# Patient Record
Sex: MALE | Race: WHITE | Employment: FULL TIME
[De-identification: names, ages, dates, MRNs, and addresses within clinical notes are randomized per-mention and may not be internally consistent; named-entity substitution may affect disease eponyms.]

---

## 2020-11-09 ENCOUNTER — NURSE TRIAGE (OUTPATIENT)
Dept: OTHER | Facility: CLINIC | Age: 30
End: 2020-11-09

## 2020-11-09 NOTE — TELEPHONE ENCOUNTER
Patient called [address] [facility] contact center [facility]) with red flag complaint; transferred for triage by Frank Tolentino. Pt calls to report symptoms of chills, joint pain, sore throat, cough, loss of smell and taste, fatigue, runny nose, diarrhea, headache, muscle pain, and fever. States symptoms started Friday. Rates cough as mild. Most recent temperature was 100.3 orally. Reports fevers are gone and then coming back. Denies breathing difficulty or chest pains. Informed of disposition. Care advice as documented. Instructed to call back with worsening symptoms. Soft transfer to [facility] ([facility]) Radha Parra) to schedule appointment as recommended. Attention Provider: Thank you for allowing me to participate in the care of your patient. The patient was connected to triage in response to information provided to the [facility]. Please do not respond through this encounter as the response is not directed to a shared pool. Reason for Disposition   [1] Fever returns after gone for over 24 hours AND [2] symptoms worse or not improved    Answer Assessment - Initial Assessment Questions  1. COVID-19 DIAGNOSIS: \"Who made your Coronavirus (COVID-19) diagnosis? \" \"Was it confirmed by a positive lab test?\" If not diagnosed by a HCP, ask \"Are there lots of cases (community spread) where you live? \" (See public health department website, if unsure)      n/a    2. ONSET: \"When did the COVID-19 symptoms start?\"       11/6/20    3. WORST SYMPTOM: \"What is your worst symptom? \" (e.g., cough, fever, shortness of breath, muscle aches)      Body aches    4. COUGH: \"Do you have a cough? \" If so, ask: \"How bad is the cough? \"        mild    5. FEVER: \"Do you have a fever? \" If so, ask: \"What is your temperature, how was it measured, and when did it start? \"      100.3 orally    6. RESPIRATORY STATUS: \"Describe your breathing? \" (e.g., shortness of breath, wheezing, unable to speak)       normal    7.  BETTER-SAME-WORSE: Daniel Castanedaes you getting better, staying the same or getting worse compared to yesterday? \"  If getting worse, ask, \"In what way? \"      Same    8. HIGH RISK DISEASE: \"Do you have any chronic medical problems? \" (e.g., asthma, heart or lung disease, weak immune system, etc.)      No    9. PREGNANCY: \"Is there any chance you are pregnant? \" \"When was your last menstrual period? \"      NA    10. OTHER SYMPTOMS: \"Do you have any other symptoms? \"  (e.g., chills, fatigue, headache, loss of smell or taste, muscle pain, sore throat)        Chills, joint pain, sore throat, cough, loss of smell and taste, fatigue, runny nose, diarrhea, headaches, muscle pain and fever.     Protocols used: CORONAVIRUS (COVID-19) DIAGNOSED OR SUSPECTED-ADULT-

## 2025-03-04 ENCOUNTER — APPOINTMENT (OUTPATIENT)
Dept: PRIMARY CARE | Facility: CLINIC | Age: 35
End: 2025-03-04
Payer: COMMERCIAL

## 2025-03-04 VITALS
HEART RATE: 84 BPM | DIASTOLIC BLOOD PRESSURE: 68 MMHG | BODY MASS INDEX: 43.86 KG/M2 | WEIGHT: 289.4 LBS | SYSTOLIC BLOOD PRESSURE: 110 MMHG | TEMPERATURE: 97.8 F | OXYGEN SATURATION: 98 % | HEIGHT: 68 IN

## 2025-03-04 DIAGNOSIS — Z76.89 ENCOUNTER TO ESTABLISH CARE: Primary | ICD-10-CM

## 2025-03-04 DIAGNOSIS — Z00.00 ENCOUNTER FOR HEALTH MAINTENANCE EXAMINATION: ICD-10-CM

## 2025-03-04 DIAGNOSIS — E66.9 OBESITY, UNSPECIFIED CLASS, UNSPECIFIED OBESITY TYPE, UNSPECIFIED WHETHER SERIOUS COMORBIDITY PRESENT: ICD-10-CM

## 2025-03-04 PROBLEM — F32.A MILD DEPRESSION: Status: ACTIVE | Noted: 2025-03-04

## 2025-03-04 PROBLEM — J45.909 ASTHMA: Status: RESOLVED | Noted: 2025-03-04 | Resolved: 2025-03-04

## 2025-03-04 PROBLEM — F41.9 ANXIETY: Status: ACTIVE | Noted: 2025-03-04

## 2025-03-04 PROBLEM — J45.909 ASTHMA: Status: ACTIVE | Noted: 2025-03-04

## 2025-03-04 PROCEDURE — 99385 PREV VISIT NEW AGE 18-39: CPT | Performed by: NURSE PRACTITIONER

## 2025-03-04 PROCEDURE — 3008F BODY MASS INDEX DOCD: CPT | Performed by: NURSE PRACTITIONER

## 2025-03-04 RX ORDER — TIRZEPATIDE 2.5 MG/.5ML
2.5 INJECTION, SOLUTION SUBCUTANEOUS
COMMUNITY
Start: 2025-02-10 | End: 2025-03-04 | Stop reason: SDUPTHER

## 2025-03-04 RX ORDER — TIRZEPATIDE 2.5 MG/.5ML
2.5 INJECTION, SOLUTION SUBCUTANEOUS
Qty: 4 EACH | Refills: 0 | Status: SHIPPED | OUTPATIENT
Start: 2025-03-04

## 2025-03-04 RX ORDER — ALBUTEROL SULFATE 90 UG/1
2 INHALANT RESPIRATORY (INHALATION) EVERY 6 HOURS PRN
COMMUNITY
Start: 2025-01-13 | End: 2025-03-04 | Stop reason: WASHOUT

## 2025-03-04 RX ORDER — BUPROPION HYDROCHLORIDE 150 MG/1
1 TABLET ORAL
COMMUNITY
Start: 2019-09-25 | End: 2025-03-04 | Stop reason: WASHOUT

## 2025-03-04 ASSESSMENT — ENCOUNTER SYMPTOMS
ARTHRALGIAS: 0
CONFUSION: 0
BACK PAIN: 0
FEVER: 0
APPETITE CHANGE: 0
CONSTIPATION: 0
SHORTNESS OF BREATH: 0
COUGH: 0
MYALGIAS: 0
WHEEZING: 0
PALPITATIONS: 0
FATIGUE: 0
DIZZINESS: 0
UNEXPECTED WEIGHT CHANGE: 0
DIAPHORESIS: 0
DIARRHEA: 0
FACIAL ASYMMETRY: 0
HEADACHES: 0
CHILLS: 0
ABDOMINAL PAIN: 0
WOUND: 0
NERVOUS/ANXIOUS: 0
CHEST TIGHTNESS: 0

## 2025-03-04 ASSESSMENT — PATIENT HEALTH QUESTIONNAIRE - PHQ9
1. LITTLE INTEREST OR PLEASURE IN DOING THINGS: NOT AT ALL
SUM OF ALL RESPONSES TO PHQ9 QUESTIONS 1 AND 2: 0
2. FEELING DOWN, DEPRESSED OR HOPELESS: NOT AT ALL

## 2025-03-04 NOTE — PROGRESS NOTES
"Subjective   Patient ID: Jack Martin is a 34 y.o. male who presents for Establish Care.    HPI entered by Medical Assistant and reviewed/updated by myself.    Patient presents in the office today to establish care. Patient was previously seen in this office by Dr. Medina. Patient last seen this physician about 5 years ago. Patient heard about us through [employer]. Patients last eye exam was 4 months ago. Patients last dental appointment was a few years.    Patient is doing weight loss via internet provider Ro and would like for a provider to monitor and regulate. Highest weight has been 307, Zepbound starting weight was 299. Has been on it for 3 weeks.  Some belching but getting better. Eating healthy, especially with dinner. Working out at the park and with home [employer] gym.    Hx childhood asthma. No issues as an adult. Anxiety and depression symptoms resolved at this time.    The patient's allergies, medications, and history were reviewed with them today and updated as indicated.    Review of Systems   Constitutional:  Negative for appetite change, chills, diaphoresis, fatigue, fever and unexpected weight change.   Respiratory:  Negative for cough, chest tightness, shortness of breath and wheezing.    Cardiovascular:  Negative for chest pain, palpitations and leg swelling.   Gastrointestinal:  Negative for abdominal pain, constipation and diarrhea.   Musculoskeletal:  Negative for arthralgias, back pain and myalgias.   Skin:  Negative for rash and wound.   Neurological:  Negative for dizziness, syncope, facial asymmetry and headaches.   Psychiatric/Behavioral:  Negative for confusion. The patient is not nervous/anxious.       Objective   Vital Signs: /68 (BP Location: Left arm, Patient Position: Sitting)   Pulse 84   Temp 36.6 °C (97.8 °F) (Temporal)   Ht 1.727 m (5' 8\")   Wt 131 kg (289 lb 6.4 oz)   SpO2 98%   BMI 44.00 kg/m²     Physical Exam  Vitals and nursing note reviewed.   Constitutional:  "      General: He is not in acute distress.     Appearance: Normal appearance. He is not ill-appearing.   HENT:      Head: Normocephalic and atraumatic.      Right Ear: External ear normal.      Left Ear: External ear normal.      Nose: No congestion or rhinorrhea.      Mouth/Throat:      Mouth: Mucous membranes are moist.      Pharynx: No oropharyngeal exudate or posterior oropharyngeal erythema.   Eyes:      General:         Right eye: No discharge.         Left eye: No discharge.      Extraocular Movements: Extraocular movements intact.      Conjunctiva/sclera: Conjunctivae normal.      Pupils: Pupils are equal, round, and reactive to light.   Cardiovascular:      Rate and Rhythm: Normal rate and regular rhythm.      Heart sounds: No murmur heard.  Pulmonary:      Effort: Pulmonary effort is normal. No respiratory distress.      Breath sounds: Normal breath sounds and air entry.   Abdominal:      General: Bowel sounds are normal.      Palpations: Abdomen is soft.   Musculoskeletal:      Right lower leg: No edema.      Left lower leg: No edema.   Skin:     General: Skin is warm and dry.      Coloration: Skin is not jaundiced.      Findings: No erythema or rash.   Neurological:      General: No focal deficit present.      Mental Status: He is alert. Mental status is at baseline.   Psychiatric:         Mood and Affect: Mood normal.         Behavior: Behavior normal.         Thought Content: Thought content normal.     POCT today: No results found for this visit on 03/04/25.     Assessment & Plan  1. Encounter to establish care  First visit to [facility]. Reviewed usual process for reviewing results, follow up care, scheduling sick visits as needed. I am in the office Tuesday-Friday most weeks. MyChart or calling the office directly at [phone], option 2, are the best ways to reach me and/or schedule appointments.       2. Encounter for health maintenance examination  Health maintenance reviewed  and updated.  Patient states he recently had labs done at work.  He will send me a copy to review, then he will complete any additional lab work if/as needed.      3. BMI 40.0-44.9, adult (Multi)  Patient has been taking Zepbound from online provider Ro for the last 3 weeks.  He would like to transition to having me manage this for him instead.  He was having some minimal GI side effects with burping/belching, but these are slowing down.  No issues with constipation or abdominal pain.  He has lost 10 pounds over the last 3 weeks.  Will send new order to the local pharmacy.  He will continue with his healthy diet and his home exercise program.      4. Obesity, unspecified class, unspecified obesity type, unspecified whether serious comorbidity present  See above.      Reviewed/discussed treatment options and health maintenance. Take medications as prescribed. We will contact you with the results of any ordered testing; please send a [employer] message or call the office if you do not hear from us. Follow up in the office with me in 6-8 weeks and as needed. Return sooner if symptoms do not resolve as expected.     Elena Singh, APRN-CNP, DNP  Family Nurse Practitioner  [facility]

## 2025-03-05 ENCOUNTER — TELEPHONE (OUTPATIENT)
Dept: PRIMARY CARE | Facility: CLINIC | Age: 35
End: 2025-03-05
Payer: COMMERCIAL

## 2025-03-05 NOTE — TELEPHONE ENCOUNTER
PLEASE SEE LABS IN MEDIA TAB AND ADVISE    Jack HAYWOOD Do [id number] [facility] Clinical Support Staff (supporting Elena Singh, APRN-CNP, DNP)1 hour ago (5:44 AM)     RC  Good morning Elena, here is the blood work that I had done with work. Let me know if this is sufficient or not.  Thank you.

## 2025-03-06 PROBLEM — F32.A MILD DEPRESSION: Status: RESOLVED | Noted: 2025-03-04 | Resolved: 2025-03-06

## 2025-03-06 PROBLEM — F41.9 ANXIETY: Status: RESOLVED | Noted: 2025-03-04 | Resolved: 2025-03-06

## 2025-03-06 NOTE — TELEPHONE ENCOUNTER
SPOKE TO JESUS IN THE LAB. SHE STATES WE HAVE TO CANCEL IT ON OUR END. ORDER CANCELLED THROUGH THE ENCOUNTER ON 3/4/25

## 2025-03-06 NOTE — TELEPHONE ENCOUNTER
Please call [facility] and cancel the lipid panel I ordered on 3/4/2025.  -----  [employer] message sent to patient.

## 2025-03-12 ENCOUNTER — TELEPHONE (OUTPATIENT)
Dept: PRIMARY CARE | Facility: CLINIC | Age: 35
End: 2025-03-12
Payer: COMMERCIAL

## 2025-03-12 NOTE — TELEPHONE ENCOUNTER
Please advise     Jack [address] [address] Clinical Support Staff (supporting MG Vogel, SCOTT)48 minutes ago (1:34 PM)   RC  I can run through this month and see if it changes drastically. Theres more hunger than when I first started, but I would like to see if I lose anymore on a lower dose . I guess I'll reach back out by week 3 of this round if that's OK? Branden [address] [place] [address] Clinical Support Staff (supporting MG Vogel, SCOTT)1 hour ago (1:19 PM)   AWAIS  I am definitely feeling more hungry as of lately and haven't felt as full as when I started the 2.5 . This is my 5th week with it.   I am at 289 this morning still        You  Jack Randolph hour ago (1:14 PM)     After eating do you feel tolbert faster? How much weight have you lost? How has your appetite been?       Jack [address] [place] [address] Clinical Support Staff (supporting MG Vogel, SCOTT)2 hours ago (11:53 AM)   AWAIS  Good morning,  I was curious about going to the 5mg dosage. I was reading that the 2.5 was just a kind of introduction to get your body used to it after a month.

## 2025-03-13 ENCOUNTER — TELEPHONE (OUTPATIENT)
Dept: PRIMARY CARE | Facility: CLINIC | Age: 35
End: 2025-03-13
Payer: COMMERCIAL

## 2025-03-13 DIAGNOSIS — E66.9 OBESITY, UNSPECIFIED CLASS, UNSPECIFIED OBESITY TYPE, UNSPECIFIED WHETHER SERIOUS COMORBIDITY PRESENT: ICD-10-CM

## 2025-03-13 NOTE — TELEPHONE ENCOUNTER
MEDICATION PENDED FOR NEXT DOSE    Jack HAYWOOD Do [id number] [facility] Clinical Support Staff (supporting MG Vogel, SCOTT)15 hours ago (4:09 PM)     RC  Ok , we can go up then . Thank you        Elena L Pelfrey, APRN-CNP, SCOTT Martin15 hours ago (4:04 PM)     SP  Hmmm... It does sound like it's probably time to go up to the next dose soon since your weight has leveled off and you are getting more hungry.     It is always your decision, but we should consider going up to the 5 mg dose with your next refill at minimum.     Thanks for the update!  Elena

## 2025-03-17 ENCOUNTER — TELEPHONE (OUTPATIENT)
Dept: PRIMARY CARE | Facility: CLINIC | Age: 35
End: 2025-03-17
Payer: COMMERCIAL

## 2025-03-17 NOTE — TELEPHONE ENCOUNTER
Please advise     Jack Martin Do [id number] [place] [address] Clinical Support Staff  Phone Number: [phone]     I saw that the 5mg prescription came through, should I finish up the 2.5 or switch? Or would it be acceptable to do 2 doses of 2.5? Sorry for all of the questions

## 2025-03-18 ENCOUNTER — TELEPHONE (OUTPATIENT)
Dept: PRIMARY CARE | Facility: CLINIC | Age: 35
End: 2025-03-18
Payer: COMMERCIAL

## 2025-03-18 NOTE — TELEPHONE ENCOUNTER
[employer] message sent.  
Pleased advise     Jack Martina[address] [address] Clinical Support Staff  Phone Number: [phone]     I picked up the 2.5 prescription last Monday ( I think)  lol,  I have used one so far. There are 3 left  
n/a

## 2025-03-19 ENCOUNTER — APPOINTMENT (OUTPATIENT)
Dept: PRIMARY CARE | Facility: CLINIC | Age: 35
End: 2025-03-19
Payer: COMMERCIAL

## 2025-03-21 ENCOUNTER — TELEPHONE (OUTPATIENT)
Dept: PRIMARY CARE | Facility: CLINIC | Age: 35
End: 2025-03-21
Payer: COMMERCIAL

## 2025-03-21 NOTE — TELEPHONE ENCOUNTER
Please advise     Jack [address] [place] [address] Clinical Support Staff (supporting MG Vogel, SCOTT)1 hour ago (2:42 PM)     RC  I am at 284 currently, I guess the 2.5 is still working. Will I get sick if I take 5mg if the 2.5 is still letting me lose weight?        Jack [address] [address] Clinical Support Staff (supporting MG Vogel, SCOTT)2 days ago     RC  Awesome,  I was able to get the 5mg yesterday from the pharmacy so I will do that. Thank you

## 2025-04-02 ENCOUNTER — TELEPHONE (OUTPATIENT)
Dept: PRIMARY CARE | Facility: CLINIC | Age: 35
End: 2025-04-02
Payer: COMMERCIAL

## 2025-04-02 NOTE — TELEPHONE ENCOUNTER
Please advise, last seen in office on 3/4/25. Has an upcoming appointment on 4/22/25    Jack Martina[address] [address] Clinical Support Staff  Phone Number: [phone]     Good afternoon, would it be possible to get some antibiotics for a sinus infection?

## 2025-04-03 ENCOUNTER — APPOINTMENT (OUTPATIENT)
Dept: PRIMARY CARE | Facility: CLINIC | Age: 35
End: 2025-04-03
Payer: COMMERCIAL

## 2025-04-03 NOTE — PROGRESS NOTES
"Subjective   Patient ID: Jack Martin is a 34 y.o. male who presents for No chief complaint on file..    HPI entered by Medical Assistant and reviewed/updated by myself.    Patient presents in office for a sinus infection. Ongoing x ***. Symptoms include ***. Has tried *** OTC medication. Admits/denies relief.     Virtual or Telephone Consent    {Complete for a virtual or telephone visit:00738}  {Select who provided consent:63309}     The patient's allergies, medications, and history were reviewed with them today and updated as indicated.    Review of Systems   Objective   Vital Signs: There were no vitals taken for this visit.    Physical ExamPOCT today: No results found for this visit on 04/03/25.     Assessment & Plan  No diagnosis found.Reviewed/discussed treatment options and health maintenance. Take medications as prescribed. We will contact you with the results of any ordered testing; please send a [employer] message or call the office if you do not hear from us. Follow up in the office {Follow up:20616::\"and as needed.\"} Return sooner if symptoms do not resolve as expected.     Elena Singh, APRN-CNP, DNP  Family Nurse Practitioner  [facility]  "

## 2025-04-10 ENCOUNTER — TELEPHONE (OUTPATIENT)
Dept: PRIMARY CARE | Facility: CLINIC | Age: 35
End: 2025-04-10
Payer: COMMERCIAL

## 2025-04-10 DIAGNOSIS — K21.9 GASTROESOPHAGEAL REFLUX DISEASE WITHOUT ESOPHAGITIS: Primary | ICD-10-CM

## 2025-04-10 RX ORDER — VONOPRAZAN FUMARATE 13.36 MG/1
10 TABLET ORAL DAILY
Qty: 90 TABLET | Refills: 1 | Status: SHIPPED | OUTPATIENT
Start: 2025-04-10

## 2025-04-10 NOTE — TELEPHONE ENCOUNTER
PLEASE ADVISE    Jack HAYWOOD Do [id number] [facility] Clinical Support Staff (supporting Elena Singh, APRN-CNP, DNP)28 minutes ago (8:32 AM)     RC  I have generally had heartburn and stomach acid issues. It seems worse at times on zepbound,  is there anything I can get prescribed to help?

## 2025-04-19 LAB
ALBUMIN SERPL-MCNC: 4.7 G/DL (ref 3.6–5.1)
ALP SERPL-CCNC: 77 U/L (ref 36–130)
ALT SERPL-CCNC: 25 U/L (ref 9–46)
ANION GAP SERPL CALCULATED.4IONS-SCNC: 10 MMOL/L (CALC) (ref 7–17)
AST SERPL-CCNC: 15 U/L (ref 10–40)
BASOPHILS # BLD AUTO: 47 CELLS/UL (ref 0–200)
BASOPHILS NFR BLD AUTO: 0.4 %
BILIRUB SERPL-MCNC: 0.3 MG/DL (ref 0.2–1.2)
BUN SERPL-MCNC: 21 MG/DL (ref 7–25)
CALCIUM SERPL-MCNC: 9.7 MG/DL (ref 8.6–10.3)
CHLORIDE SERPL-SCNC: 104 MMOL/L (ref 98–110)
CO2 SERPL-SCNC: 25 MMOL/L (ref 20–32)
CREAT SERPL-MCNC: 1.18 MG/DL (ref 0.6–1.26)
EGFRCR SERPLBLD CKD-EPI 2021: 83 ML/MIN/1.73M2
EOSINOPHIL # BLD AUTO: 164 CELLS/UL (ref 15–500)
EOSINOPHIL NFR BLD AUTO: 1.4 %
ERYTHROCYTE [DISTWIDTH] IN BLOOD BY AUTOMATED COUNT: 13.2 % (ref 11–15)
GLUCOSE SERPL-MCNC: 98 MG/DL (ref 65–99)
HCT VFR BLD AUTO: 46 % (ref 38.5–50)
HGB BLD-MCNC: 14.9 G/DL (ref 13.2–17.1)
LYMPHOCYTES # BLD AUTO: 3674 CELLS/UL (ref 850–3900)
LYMPHOCYTES NFR BLD AUTO: 31.4 %
MCH RBC QN AUTO: 26.9 PG (ref 27–33)
MCHC RBC AUTO-ENTMCNC: 32.4 G/DL (ref 32–36)
MCV RBC AUTO: 83.2 FL (ref 80–100)
MONOCYTES # BLD AUTO: 971 CELLS/UL (ref 200–950)
MONOCYTES NFR BLD AUTO: 8.3 %
NEUTROPHILS # BLD AUTO: 6845 CELLS/UL (ref 1500–7800)
NEUTROPHILS NFR BLD AUTO: 58.5 %
PLATELET # BLD AUTO: 314 THOUSAND/UL (ref 140–400)
PMV BLD REES-ECKER: 11.9 FL (ref 7.5–12.5)
POTASSIUM SERPL-SCNC: 4.6 MMOL/L (ref 3.5–5.3)
PROT SERPL-MCNC: 7.7 G/DL (ref 6.1–8.1)
RBC # BLD AUTO: 5.53 MILLION/UL (ref 4.2–5.8)
SODIUM SERPL-SCNC: 139 MMOL/L (ref 135–146)
WBC # BLD AUTO: 11.7 THOUSAND/UL (ref 3.8–10.8)

## 2025-04-22 ENCOUNTER — APPOINTMENT (OUTPATIENT)
Dept: PRIMARY CARE | Facility: CLINIC | Age: 35
End: 2025-04-22
Payer: COMMERCIAL

## 2025-04-22 VITALS
HEIGHT: 68 IN | DIASTOLIC BLOOD PRESSURE: 70 MMHG | OXYGEN SATURATION: 96 % | BODY MASS INDEX: 42.59 KG/M2 | TEMPERATURE: 97.9 F | WEIGHT: 281 LBS | HEART RATE: 110 BPM | SYSTOLIC BLOOD PRESSURE: 140 MMHG

## 2025-04-22 DIAGNOSIS — Z76.89 ENCOUNTER FOR WEIGHT MANAGEMENT: Primary | ICD-10-CM

## 2025-04-22 DIAGNOSIS — E66.9 OBESITY, UNSPECIFIED CLASS, UNSPECIFIED OBESITY TYPE, UNSPECIFIED WHETHER SERIOUS COMORBIDITY PRESENT: ICD-10-CM

## 2025-04-22 DIAGNOSIS — R53.83 OTHER FATIGUE: ICD-10-CM

## 2025-04-22 DIAGNOSIS — F41.9 ANXIETY: ICD-10-CM

## 2025-04-22 PROCEDURE — 3008F BODY MASS INDEX DOCD: CPT | Performed by: NURSE PRACTITIONER

## 2025-04-22 PROCEDURE — 99214 OFFICE O/P EST MOD 30 MIN: CPT | Performed by: NURSE PRACTITIONER

## 2025-04-22 RX ORDER — BUPROPION HYDROCHLORIDE 150 MG/1
150 TABLET ORAL DAILY
Qty: 30 TABLET | Refills: 11 | Status: SHIPPED | OUTPATIENT
Start: 2025-04-22 | End: 2026-04-22

## 2025-04-22 ASSESSMENT — PATIENT HEALTH QUESTIONNAIRE - PHQ9
SUM OF ALL RESPONSES TO PHQ9 QUESTIONS 1 AND 2: 0
2. FEELING DOWN, DEPRESSED OR HOPELESS: NOT AT ALL
1. LITTLE INTEREST OR PLEASURE IN DOING THINGS: NOT AT ALL

## 2025-04-22 NOTE — PROGRESS NOTES
"Subjective   Patient ID: Jack Martin is a 35 y.o. male who presents for Weight Loss and Anxiety.    HPI entered by Medical Assistant and reviewed/updated by myself.    Weight loss management Follow up   Taking ZEPBOUND  5 mg   Following up for month # 3  Has been eating a generally healthy diet  Exercises 4 x per week  What type of exercise lifting,walking,hiking, yard work  Patient last in office weight 289.6 lbs  At home weight 278.6 lbs  Today's in office weight 281 lbs   Patient is - 8.6 lbs   Patient's total weight loss with at home janet is -11 lbs   Has previously taken nothing   Any side effects noted? Sometimes heartburn and constipation, much improved since starting Voquezna.    Patient would like something for Anxiety and for provider to go over recent lab work. Previously on Cymbalta, caused insomnia, erectile dysfunction.    The patient's allergies, medications, and history were reviewed with them today and updated as indicated.    Review of Systems   Constitutional:  Negative for appetite change, chills, diaphoresis, fatigue, fever and unexpected weight change.   Respiratory:  Negative for cough, chest tightness, shortness of breath and wheezing.    Cardiovascular:  Negative for chest pain, palpitations and leg swelling.   Gastrointestinal:  Negative for abdominal pain, constipation and diarrhea.   Musculoskeletal:  Negative for arthralgias, back pain and myalgias.   Skin:  Negative for rash and wound.   Neurological:  Negative for dizziness, syncope, facial asymmetry and headaches.   Psychiatric/Behavioral:  Negative for confusion. The patient is nervous/anxious.       Objective   Vital Signs: /70 (BP Location: Left arm, Patient Position: Sitting)   Pulse 110   Temp 36.6 °C (97.9 °F) (Temporal)   Ht 1.727 m (5' 8\")   Wt 127 kg (281 lb)   SpO2 96%   BMI 42.73 kg/m²     Physical Exam  Vitals and nursing note reviewed.   Constitutional:       General: He is not in acute distress.     " Appearance: Normal appearance. He is not ill-appearing.   HENT:      Head: Normocephalic and atraumatic.      Right Ear: External ear normal.      Left Ear: External ear normal.      Nose: No congestion or rhinorrhea.      Mouth/Throat:      Mouth: Mucous membranes are moist.      Pharynx: No oropharyngeal exudate or posterior oropharyngeal erythema.   Eyes:      General:         Right eye: No discharge.         Left eye: No discharge.      Extraocular Movements: Extraocular movements intact.      Conjunctiva/sclera: Conjunctivae normal.      Pupils: Pupils are equal, round, and reactive to light.   Cardiovascular:      Rate and Rhythm: Normal rate and regular rhythm.      Heart sounds: No murmur heard.  Pulmonary:      Effort: Pulmonary effort is normal. No respiratory distress.      Breath sounds: Normal breath sounds and air entry.   Abdominal:      General: Bowel sounds are normal. There is no distension.      Palpations: Abdomen is soft.      Tenderness: There is no abdominal tenderness.   Musculoskeletal:      Right lower leg: No edema.      Left lower leg: No edema.   Skin:     General: Skin is warm and dry.      Coloration: Skin is not jaundiced.      Findings: No erythema or rash.   Neurological:      General: No focal deficit present.      Mental Status: He is alert. Mental status is at baseline.   Psychiatric:         Mood and Affect: Mood normal.         Behavior: Behavior normal.         Thought Content: Thought content normal.      POCT today: No results found for this visit on 04/22/25.     Assessment & Plan  1. Encounter for weight management  Doing well with Zepbound.  Has lost 11 pounds over the last 2 months.  GI side effects have improved with addition of PPI.  Will continue at same dose for now.  Continue healthy diet and exercise at least 150 minutes/week.  If weight loss stalls or appetite increases, let me know and we will increase the dose for future refills.  Verbalized understanding.       2. BMI 40.0-44.9, adult (Multi)  See above.  -tirzepatide, weight loss, (Zepbound) 5 mg/0.5 mL injection      3. Obesity, unspecified class, unspecified obesity type, unspecified whether serious comorbidity present  See above.  -tirzepatide, weight loss, (Zepbound) 5 mg/0.5 mL injection      4. 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  He was previously on Cymbalta but had side effects from it including erectile dysfunction.  Will trial Wellbutrin instead.    buPROPion XL (Wellbutrin XL) 150 mg 24 hr tablet      5. Other fatigue  See above.  -Testosterone, total and free    -Tsh With Reflex To Free T4 If Abnormal      Reviewed/discussed treatment options and health maintenance. Take medications as prescribed. We will contact you with the results of any ordered testing; please send a OneTouch message or call the office if you do not hear from us. Follow up in the office with me in 3 months and as needed. Return sooner if symptoms do not resolve as expected.     Elena Singh, APRN-CNP, DNP  Family Nurse Practitioner  [facility]

## 2025-05-01 PROBLEM — E66.9 OBESITY: Status: ACTIVE | Noted: 2025-05-01

## 2025-05-01 ASSESSMENT — ENCOUNTER SYMPTOMS
CONSTIPATION: 0
NERVOUS/ANXIOUS: 1
CHEST TIGHTNESS: 0
PALPITATIONS: 0
ARTHRALGIAS: 0
ABDOMINAL PAIN: 0
FACIAL ASYMMETRY: 0
COUGH: 0
FATIGUE: 0
DIZZINESS: 0
UNEXPECTED WEIGHT CHANGE: 0
CONFUSION: 0
HEADACHES: 0
CHILLS: 0
APPETITE CHANGE: 0
WOUND: 0
WHEEZING: 0
DIAPHORESIS: 0
BACK PAIN: 0
FEVER: 0
SHORTNESS OF BREATH: 0
DIARRHEA: 0
MYALGIAS: 0

## 2025-05-05 ENCOUNTER — PATIENT MESSAGE (OUTPATIENT)
Dept: PRIMARY CARE | Facility: CLINIC | Age: 35
End: 2025-05-05
Payer: COMMERCIAL

## 2025-05-05 ENCOUNTER — TELEPHONE (OUTPATIENT)
Dept: PRIMARY CARE | Facility: CLINIC | Age: 35
End: 2025-05-05

## 2025-05-05 NOTE — TELEPHONE ENCOUNTER
Jack Am[address] [facility] Clinical Support Staff (supporting Elena Singh, APRN-CNP, DNP) (Selected Message)        5/5/25  6:37 AM  At the end of this month would it be OK to up the dosage from 150 to what's next? Also is there anything that I could possibly take for like situational on occasion on top of the wellbutrin?

## 2025-05-06 ENCOUNTER — TELEPHONE (OUTPATIENT)
Dept: PRIMARY CARE | Facility: CLINIC | Age: 35
End: 2025-05-06
Payer: COMMERCIAL

## 2025-05-06 DIAGNOSIS — F41.9 ANXIETY: Primary | ICD-10-CM

## 2025-05-06 DIAGNOSIS — E66.9 OBESITY, UNSPECIFIED CLASS, UNSPECIFIED OBESITY TYPE, UNSPECIFIED WHETHER SERIOUS COMORBIDITY PRESENT: ICD-10-CM

## 2025-05-06 RX ORDER — HYDROXYZINE HYDROCHLORIDE 25 MG/1
TABLET, FILM COATED ORAL
Qty: 90 TABLET | Refills: 1 | Status: SHIPPED | OUTPATIENT
Start: 2025-05-06

## 2025-05-06 NOTE — TELEPHONE ENCOUNTER
PATIENT CALLING, HE SENT IN A MY CHART MESSAGE ABOUT HIS WELLBUTRIN BUT IT LOOKS LIKE HIS ZEPBOUND WAS SENT IN FOR AN INCREASE IN DOSAGE.    HE IS WANTING TO KNOW IF HIS WELLBUTRIN  MG CAN BE INCREASED TO THE WELLBUTRIN  MG AT THE END OF MAY.    ALSO HE IS WANTING TO KNOW IF THERE IS SOMETHING THAT HE CAN TAKE IF HE GETS AN ANXIETY ATTACK RANDOMLY THROUGH OUT THE DAY.    PLEASE ADVISE.

## 2025-05-06 NOTE — TELEPHONE ENCOUNTER
My apologies, I corrected the Zepbound.    He needs to give the Wellbutrin at least a month to build up in his system.    I sent over hydroxyzine.  He can take 1 to 2 tablets every 8 hours as needed for breakthrough anxiety.  He should try this at home first to see if it makes him tired.

## 2025-05-07 NOTE — TELEPHONE ENCOUNTER
Patient aware.     Would like to know if after the first month of taking the Wellbutrin he would increase the dosage if he feels at the end of the month it is not working for him.     Patient also sent in a [employer] message asking to go to the increased dosage of Zepbound. Medication pended       Jack Martin to YOBANY Amar[address] [facility] Clinical Support Staff (supporting Elena Singh, CJ-CNP, DNP)        5/6/25  5:29 PM  I would like to go up on the zepbound to 7.5 as well if that's still possible. Sorry for all of the confusion

## 2025-05-08 ENCOUNTER — TELEPHONE (OUTPATIENT)
Dept: PRIMARY CARE | Facility: CLINIC | Age: 35
End: 2025-05-08
Payer: COMMERCIAL

## 2025-05-08 NOTE — TELEPHONE ENCOUNTER
PLEASE ADVISE         Jack Martin to YOBANY [address] [place] [address] Clinical Support Staff (supporting Elena Singh, APRN-CNP, DNP) (Selected Message)        5/8/25  1:58 PM  I took 1 hydroxyzine and I I was extremely groggy from 7pm to 3pm the next day. Would there be any other options?

## 2025-05-13 RX ORDER — ALPRAZOLAM 0.25 MG/1
0.25 TABLET ORAL NIGHTLY PRN
Qty: 7 TABLET | Refills: 0 | Status: SHIPPED | OUTPATIENT
Start: 2025-05-13 | End: 2025-05-20

## 2025-05-13 NOTE — TELEPHONE ENCOUNTER
Jack Martin to YOBANY Do [id number] [facility] Clinical Support Staff (supporting Elena Singh, APRN-CNP, DNP) (Selected Message)        5/13/25 11:57 AM  Yes I would like to try those suggestions (going up on wellbutrin and trying the Xanax) The drug program is fine, please let me know what I need to do or where I need to go. [facility] had the 5.0 for the zepbound to automatically refill, the 7.5 wasn't there. I'm sorry for all of the messages lol thank you        Zepbound pended. Please advise patient message

## 2025-05-13 NOTE — TELEPHONE ENCOUNTER
"Please call Jack:    My apologies for the delay; I have been out of the office since the end of last week.    -Increased dose of Zepbound resent as requested.    - If he feels the Wellbutrin is not helping at all after the first month, we can adjust the dose.    - An alternative as a short-acting \"rescue\" med to the hydroxyzine/Atarax would be to try something like Xanax instead.  If you would like to try this, I can send over a few pills.  These fall into the category of \"controlled substances,\" so if he is going to be using these as needed then we would need to do a urine drug screen per  policy to show that he is not using any other controlled substances including marijuana of any form.  Let me know if he would like to try this.    Thanks,  Elena  "

## 2025-05-13 NOTE — TELEPHONE ENCOUNTER
Xanax and Zepbound sent to the pharmacy.  If the Xanax is working well for him, he can come in for an MA visit to do UDS and CSA.  Please explain the process to him.    ThanksElena

## 2025-05-22 ENCOUNTER — CLINICAL SUPPORT (OUTPATIENT)
Dept: PRIMARY CARE | Facility: CLINIC | Age: 35
End: 2025-05-22
Payer: COMMERCIAL

## 2025-05-22 DIAGNOSIS — F41.9 ANXIETY: ICD-10-CM

## 2025-05-22 DIAGNOSIS — Z51.81 THERAPEUTIC DRUG MONITORING: ICD-10-CM

## 2025-05-22 RX ORDER — ALPRAZOLAM 0.25 MG/1
0.25 TABLET ORAL NIGHTLY PRN
Qty: 30 TABLET | Refills: 0 | Status: SHIPPED | OUTPATIENT
Start: 2025-05-22

## 2025-05-22 RX ORDER — BUPROPION HYDROCHLORIDE 100 MG/1
150 TABLET, EXTENDED RELEASE ORAL 2 TIMES DAILY
COMMUNITY
End: 2025-05-22 | Stop reason: SDUPTHER

## 2025-05-22 RX ORDER — BUPROPION HYDROCHLORIDE 150 MG/1
150 TABLET, EXTENDED RELEASE ORAL 2 TIMES DAILY
Qty: 30 TABLET | Refills: 1 | Status: SHIPPED | OUTPATIENT
Start: 2025-05-22 | End: 2025-06-04 | Stop reason: ALTCHOICE

## 2025-05-22 NOTE — TELEPHONE ENCOUNTER
Patient would like to not take lexapro due its side effects being similar to simbulta that he had taken in the past. He asked to continue on wellbutrin but maybe a higher dosage.

## 2025-05-23 LAB
A-OH ALPRAZ UR-MCNC: NORMAL NG/ML
AMPHETAMINES UR QL: NORMAL NG/ML
BARBITURATES UR QL: NORMAL NG/ML
BZE UR QL: NORMAL NG/ML
CODEINE UR-MCNC: NORMAL NG/ML
CREAT UR-MCNC: NORMAL MG/DL
EDDP UR-MCNC: NORMAL NG/ML
FENTANYL UR-MCNC: NORMAL NG/ML
NORTRAMADOL UR-MCNC: NORMAL NG/ML
PCP UR QL: NORMAL NG/ML
QUEST 6 ACETYLMORPHINE: NORMAL NG/ML
QUEST NOTES AND COMMENTS: NORMAL
QUEST ZOLPIDEM: NORMAL NG/ML
THC UR QL: NORMAL NG/ML

## 2025-05-25 LAB
1OH-MIDAZOLAM UR-MCNC: NEGATIVE NG/ML
7AMINOCLONAZEPAM UR-MCNC: NEGATIVE NG/ML
A-OH ALPRAZ UR-MCNC: NEGATIVE NG/ML
A-OH-TRIAZOLAM UR-MCNC: NEGATIVE NG/ML
AMPHETAMINES UR QL: NEGATIVE NG/ML
BARBITURATES UR QL: NEGATIVE NG/ML
BZE UR QL: NEGATIVE NG/ML
CODEINE UR-MCNC: NORMAL NG/ML
CREAT UR-MCNC: 238.1 MG/DL
DRUG SCREEN COMMENT UR-IMP: NORMAL
EDDP UR-MCNC: NORMAL NG/ML
FENTANYL UR-MCNC: NEGATIVE NG/ML
HYDROCODONE UR-MCNC: NORMAL UG/ML
HYDROMORPHONE UR-MCNC: NORMAL NG/ML
LORAZEPAM UR-MCNC: NEGATIVE NG/ML
METHADONE UR-MCNC: NORMAL UG/L
MORPHINE UR-MCNC: NORMAL NG/ML
NORDIAZEPAM UR-MCNC: NEGATIVE NG/ML
NORFENTANYL UR-MCNC: NEGATIVE NG/ML
NORHYDROCODONE UR CFM-MCNC: NORMAL NG/ML
NOROXYCODONE UR CFM-MCNC: NORMAL NG/ML
NORTRAMADOL UR-MCNC: NEGATIVE NG/ML
OH-ETHYLFLURAZ UR-MCNC: NEGATIVE NG/ML
OXAZEPAM UR-MCNC: NEGATIVE NG/ML
OXIDANTS UR QL: NEGATIVE MCG/ML
OXYCODONE UR CFM-MCNC: NORMAL NG/ML
OXYMORPHONE UR CFM-MCNC: NORMAL NG/ML
PCP UR QL: NEGATIVE NG/ML
PH UR: 5.1 [PH] (ref 4.5–9)
QUEST 6 ACETYLMORPHINE: NEGATIVE NG/ML
QUEST NOTES AND COMMENTS: NORMAL
QUEST PATIENT HISTORICAL REPORT: NORMAL
QUEST ZOLPIDEM: NEGATIVE NG/ML
TEMAZEPAM UR-MCNC: NEGATIVE NG/ML
THC UR QL: NEGATIVE NG/ML
TRAMADOL UR-MCNC: NEGATIVE NG/ML
ZOLPIDEM PHENYL-4-CARB UR CFM-MCNC: NEGATIVE NG/ML

## 2025-05-26 LAB
1OH-MIDAZOLAM UR-MCNC: NEGATIVE NG/ML
7AMINOCLONAZEPAM UR-MCNC: NEGATIVE NG/ML
A-OH ALPRAZ UR-MCNC: NEGATIVE NG/ML
A-OH-TRIAZOLAM UR-MCNC: NEGATIVE NG/ML
AMPHETAMINES UR QL: NEGATIVE NG/ML
BARBITURATES UR QL: NEGATIVE NG/ML
BZE UR QL: NEGATIVE NG/ML
CODEINE UR-MCNC: NEGATIVE NG/ML
CREAT UR-MCNC: 238.1 MG/DL
DRUG SCREEN COMMENT UR-IMP: NORMAL
EDDP UR-MCNC: NEGATIVE NG/ML
FENTANYL UR-MCNC: NEGATIVE NG/ML
HYDROCODONE UR-MCNC: NEGATIVE NG/ML
HYDROMORPHONE UR-MCNC: NEGATIVE NG/ML
LORAZEPAM UR-MCNC: NEGATIVE NG/ML
METHADONE UR-MCNC: NEGATIVE NG/ML
MORPHINE UR-MCNC: NEGATIVE NG/ML
NORDIAZEPAM UR-MCNC: NEGATIVE NG/ML
NORFENTANYL UR-MCNC: NEGATIVE NG/ML
NORHYDROCODONE UR CFM-MCNC: NEGATIVE NG/ML
NOROXYCODONE UR CFM-MCNC: NEGATIVE NG/ML
NORTRAMADOL UR-MCNC: NEGATIVE NG/ML
OH-ETHYLFLURAZ UR-MCNC: NEGATIVE NG/ML
OXAZEPAM UR-MCNC: NEGATIVE NG/ML
OXIDANTS UR QL: NEGATIVE MCG/ML
OXYCODONE UR CFM-MCNC: NEGATIVE NG/ML
OXYMORPHONE UR CFM-MCNC: NEGATIVE NG/ML
PCP UR QL: NEGATIVE NG/ML
PH UR: 5.1 [PH] (ref 4.5–9)
QUEST 6 ACETYLMORPHINE: NEGATIVE NG/ML
QUEST NOTES AND COMMENTS: NORMAL
QUEST ZOLPIDEM: NEGATIVE NG/ML
TEMAZEPAM UR-MCNC: NEGATIVE NG/ML
THC UR QL: NEGATIVE NG/ML
TRAMADOL UR-MCNC: NEGATIVE NG/ML
ZOLPIDEM PHENYL-4-CARB UR CFM-MCNC: NEGATIVE NG/ML

## 2025-06-02 ENCOUNTER — TELEPHONE (OUTPATIENT)
Dept: PRIMARY CARE | Facility: CLINIC | Age: 35
End: 2025-06-02
Payer: COMMERCIAL

## 2025-06-02 DIAGNOSIS — F41.9 ANXIETY: Primary | ICD-10-CM

## 2025-06-02 NOTE — TELEPHONE ENCOUNTER
PATIENT CALLING, HIS WELLBUTRIN WAS INCREASED  MG BUT HE DOES NOT FEEL THAT IT IS WORKING. HE STATES THAT YOU WANTED TO CHANGE HIM TO LEXAPRO BUT HE WANTED TO TRY THE INCREASE ON THE WELLBUTRIN. HE IS NOW WANTING TO TAKE THE LEXAPRO BUT IS WANTING TO KNOW IF HE CAN JUST START TAKING THE LEXAPRO AND DISCONTINUE THE WELLBUTRIN.    PLEASE ADVISE.      PT AWARE YOU ARE OUT OF THE OFFICE.

## 2025-06-04 RX ORDER — ESCITALOPRAM OXALATE 10 MG/1
10 TABLET ORAL DAILY
Qty: 30 TABLET | Refills: 1 | Status: SHIPPED | OUTPATIENT
Start: 2025-06-04

## 2025-06-04 NOTE — TELEPHONE ENCOUNTER
Lexapro sent to the pharmacy.  Cut back down to one Wellbutrin/day for one week while starting Lexapro, then stop Wellbutrin completely while continuing Lexapro.  Needs to stay on Lexapro for at least 4 to 6 weeks before any further dose adjustments/changes unless having side effects.

## 2025-06-05 ENCOUNTER — TELEPHONE (OUTPATIENT)
Dept: PRIMARY CARE | Facility: CLINIC | Age: 35
End: 2025-06-05

## 2025-06-05 NOTE — TELEPHONE ENCOUNTER
Please advise       Jack Martin to P Do [id number] [facility] Clinical Support Staff (supporting MG Vogel, SCOTT) (Selected Message)        6/1/25  7:00 PM  Also, Monday I am going to start lexapro. Even with the increased dosage of the wellbutrin I haven't really noticed any difference. I should have just went with that initially. I'm sorry  Jack Martin to P Do [id number] [facility] Clinical Support Staff (supporting MG Vogel, SCOTT)        5/31/25  6:23 PM  I received a piece of mail from [employer] I guess they handle my prescriptions. They will no longer be doing zepbound and are switching to wegovy, they said they only way that it would be possible to get the zepbound would be to have my dr submit a plan exception. This starts July 1st. Is a plan exception easy to get? Is wegovy effective? Would it be possible to get a 3 month supply of zepbound of one isn't obtainable?

## 2025-06-06 NOTE — TELEPHONE ENCOUNTER
"Please let Jack know we will help get him switched over to Wegovy next month, then if he has issues we will have supporting documentation to request he go back on Zepbound.     Unfortunately, insurance won't allow us to \"stock up\" ahead of time by ordering more than a month at once.  "

## 2025-06-06 NOTE — TELEPHONE ENCOUNTER
Please advise patient provided picture located in media tab    Jack Mario HAYWOOD Do [id number] [facility] Clinical Support Staff (supporting Elena Singh, APRN-CNP, DNP) (Selected Message)        6/5/25  5:14 PM  Good evening, this is what I was trying to explain on the phone.  My insurance uses [employer] to handle all their prescriptions. Would it be possible to get zepbound approved once they pull it , and is wegovy effective  if we can't get zepbound anymore?   Attachments   [phone]

## 2025-07-11 DIAGNOSIS — F41.9 ANXIETY: ICD-10-CM

## 2025-07-11 RX ORDER — ALPRAZOLAM 0.25 MG/1
0.25 TABLET ORAL NIGHTLY PRN
Qty: 30 TABLET | Refills: 0 | Status: SHIPPED | OUTPATIENT
Start: 2025-07-11

## 2025-07-11 NOTE — TELEPHONE ENCOUNTER
Jack [id number] [place] [id number] Clinical Support Staff (supporting Elena Singh, APRN-CNP, DNP) (Selected Message)        7/10/25 11:19 AM  Good morning,  can I get a refill for my Xanax prescription. Thank you

## 2025-07-22 ENCOUNTER — APPOINTMENT (OUTPATIENT)
Dept: PRIMARY CARE | Facility: CLINIC | Age: 35
End: 2025-07-22
Payer: COMMERCIAL

## 2025-07-23 ENCOUNTER — APPOINTMENT (OUTPATIENT)
Dept: PRIMARY CARE | Facility: CLINIC | Age: 35
End: 2025-07-23
Payer: COMMERCIAL

## 2025-07-23 ENCOUNTER — OFFICE VISIT (OUTPATIENT)
Dept: PRIMARY CARE | Facility: CLINIC | Age: 35
End: 2025-07-23
Payer: COMMERCIAL

## 2025-07-23 VITALS
TEMPERATURE: 97.1 F | OXYGEN SATURATION: 96 % | HEIGHT: 68 IN | DIASTOLIC BLOOD PRESSURE: 60 MMHG | WEIGHT: 254 LBS | RESPIRATION RATE: 16 BRPM | HEART RATE: 100 BPM | SYSTOLIC BLOOD PRESSURE: 110 MMHG | BODY MASS INDEX: 38.49 KG/M2

## 2025-07-23 DIAGNOSIS — F41.9 ANXIETY: Primary | ICD-10-CM

## 2025-07-23 DIAGNOSIS — F41.9 ANXIETY: ICD-10-CM

## 2025-07-23 DIAGNOSIS — Z76.89 ENCOUNTER FOR WEIGHT MANAGEMENT: Primary | ICD-10-CM

## 2025-07-23 PROCEDURE — 3008F BODY MASS INDEX DOCD: CPT | Performed by: NURSE PRACTITIONER

## 2025-07-23 PROCEDURE — 99214 OFFICE O/P EST MOD 30 MIN: CPT | Performed by: NURSE PRACTITIONER

## 2025-07-23 RX ORDER — BUPROPION HYDROCHLORIDE 150 MG/1
150 TABLET ORAL EVERY MORNING
Qty: 21 TABLET | Refills: 0 | Status: SHIPPED | OUTPATIENT
Start: 2025-07-23 | End: 2025-08-13

## 2025-07-23 RX ORDER — BUPROPION HYDROCHLORIDE 300 MG/1
300 TABLET ORAL EVERY MORNING
Qty: 90 TABLET | Refills: 1 | Status: SHIPPED | OUTPATIENT
Start: 2025-08-14

## 2025-07-23 ASSESSMENT — ENCOUNTER SYMPTOMS
DEPRESSION: 0
OCCASIONAL FEELINGS OF UNSTEADINESS: 0
LOSS OF SENSATION IN FEET: 0

## 2025-07-23 ASSESSMENT — PATIENT HEALTH QUESTIONNAIRE - PHQ9
2. FEELING DOWN, DEPRESSED OR HOPELESS: NOT AT ALL
1. LITTLE INTEREST OR PLEASURE IN DOING THINGS: NOT AT ALL
2. FEELING DOWN, DEPRESSED OR HOPELESS: NOT AT ALL
SUM OF ALL RESPONSES TO PHQ9 QUESTIONS 1 AND 2: 0
SUM OF ALL RESPONSES TO PHQ9 QUESTIONS 1 AND 2: 0
1. LITTLE INTEREST OR PLEASURE IN DOING THINGS: NOT AT ALL

## 2025-07-23 NOTE — PROGRESS NOTES
"Subjective   Patient ID: Jack Martin is a 35 y.o. male who presents for 3 month Follow-up.     HPI entered by Medical Assistant and reviewed/updated by myself. The patient's allergies, medications, and history were reviewed with them today and updated as indicated.    Here to follow up on Wegovy 1.7 mg. Was previously on Zepbound but insurance stopped covering it. Increased GI symptoms with Wegovy.    Here to follow up on anxiety. Tasking Lexapro, hydroxyzine PRN, Xanax sparingly.    Some side effects from lexapro sexual issues. Previously on Wellbutrin, no side effe     Review of Systems   Objective   Vital Signs: /60   Pulse 100   Temp 36.2 °C (97.1 °F)   Resp 16   Ht 1.727 m (5' 8\")   Wt 115 kg (254 lb)   SpO2 96%   BMI 38.62 kg/m²     Physical ExamPOCT today: No results found for this visit on 07/23/25.     Assessment & Plan  There are no diagnoses linked to this encounter.    Vralar if needed.      Reviewed/discussed treatment options and health maintenance. Take medications as prescribed. We will contact you with the results of any ordered testing; please send a [employer] message or call the office if you do not hear from us. Follow up in the office in 3 months and as needed. Return sooner if symptoms do not resolve as expected.     Elena Singh, CJ-CNP, DNP  Family Nurse Practitioner  [facility]   " Right eye: No discharge.         Left eye: No discharge.      Extraocular Movements: Extraocular movements intact.      Conjunctiva/sclera: Conjunctivae normal.      Pupils: Pupils are equal, round, and reactive to light.       Cardiovascular:      Rate and Rhythm: Normal rate and regular rhythm.      Heart sounds: No murmur heard.  Pulmonary:      Effort: Pulmonary effort is normal. No respiratory distress.      Breath sounds: Normal breath sounds and air entry.   Abdominal:      General: Bowel sounds are normal.      Palpations: Abdomen is soft.     Musculoskeletal:      Right lower leg: No edema.      Left lower leg: No edema.     Skin:     General: Skin is warm and dry.      Coloration: Skin is not jaundiced.      Findings: No erythema or rash.     Neurological:      General: No focal deficit present.      Mental Status: He is alert. Mental status is at baseline.     Psychiatric:         Mood and Affect: Mood normal.         Behavior: Behavior normal.         Thought Content: Thought content normal.     POCT today: No results found for this visit on 07/23/25.     Assessment & Plan  Diagnoses and all orders for this visit:    Encounter for weight management  BMI 38.0-38.9,adult  Doing well with semaglutide.  Tolerating mild increase in GI side effects compared to previous Zepbound which is no longer covered by his insurance.  No change in dose today.    Anxiety  Having some sexual dysfunction with Lexapro.  Advised we go to Wellbutrin.  Will go to 150 mg extended release for 3 weeks, then increase to 300 mg extended release.  Reinforced that we need to stay on this medication for around 8 weeks to fully assess its benefit.   If he is tolerating this medication but he feels his symptoms are not fully treated at this time, we can consider adding Vraylar. He verbalized understanding and is agreeable to the plan.  -     buPROPion XL (Wellbutrin XL) 150 mg 24 hr tablet; Take 1 tablet (150 mg) by mouth once daily in  the morning for 21 days. Do not crush, chew, or split.  -     buPROPion XL (Wellbutrin XL) 300 mg 24 hr tablet; Take 1 tablet (300 mg) by mouth once daily in the morning. Do not crush, chew, or split. Do not fill before August 14, 2025.    Reviewed/discussed treatment options and health maintenance. Take medications as prescribed. We will contact you with the results of any ordered testing; please send a [employer] message or call the office if you do not hear from us. Follow up in the office in 3 months and as needed. Return sooner if symptoms do not resolve as expected.     Elena Singh, CJ-CNP, DNP  Family Nurse Practitioner  [facility]

## 2025-07-23 NOTE — PROGRESS NOTES
"Subjective   Patient ID: Jack Martin is a 35 y.o. male who presents for No chief complaint on file..    HPI entered by Medical Assistant and reviewed/updated by myself.    Weight loss management Follow up   Taking WEGOVY 1.7 mg   Following up for month # ***  Has been eating a generally ***healthy diet  Exercises *** x per week  What type of exercise ***  Patient last in office weight *** lbs  Today's in office weight *** lbs   Patient is - *** lbs   Has previously taken ***   Co morbidities include- {Comorbidities:75915}  Any side effects noted? ***      The patient's allergies, medications, and history were reviewed with them today and updated as indicated.    Review of Systems   Objective   Vital Signs: There were no vitals taken for this visit.    Physical Exam POCT today: No results found for this visit on 07/23/25.     Assessment & Plan  No diagnosis found.Reviewed/discussed treatment options and health maintenance. Take medications as prescribed. We will contact you with the results of any ordered testing; please send a [employer] message or call the office if you do not hear from us. Follow up in the office {Follow up:67297::\"and as needed.\"} Return sooner if symptoms do not resolve as expected.     Patient will benefit from weight loss therapy given the following:  Advised to take *** ***.    Patient attests to BOTH of the following:    Prior to treatment the  patient has trialed lifestyle modification that promotes a reduced calorie diet and exercise of at least 150 minutes per week for at least 6 months and patient has failed to achieve adequate weight loss.    Patient understands to continue with the requested medication with continued lifestyle and behavioral modifications that promotes a reduced calorie diet and exercise of at least 150 minutes per week.     Benefits, risks, possible adverse effects to the medication discussed today      Elena Singh, APRN-CNP, DNP  Family Nurse " Practitioner  [facility]

## 2025-08-06 ASSESSMENT — ENCOUNTER SYMPTOMS
CONSTIPATION: 0
DIARRHEA: 0
BACK PAIN: 0
MYALGIAS: 0
DIZZINESS: 0
COUGH: 0
CONFUSION: 0
PALPITATIONS: 0
ARTHRALGIAS: 0
HEADACHES: 0
WOUND: 0
FEVER: 0
FACIAL ASYMMETRY: 0
FATIGUE: 0
NERVOUS/ANXIOUS: 1
SHORTNESS OF BREATH: 0
WHEEZING: 0
DIAPHORESIS: 0
APPETITE CHANGE: 0
ABDOMINAL PAIN: 0
CHILLS: 0
CHEST TIGHTNESS: 0
UNEXPECTED WEIGHT CHANGE: 0

## 2025-08-14 ENCOUNTER — TELEPHONE (OUTPATIENT)
Dept: PRIMARY CARE | Facility: CLINIC | Age: 35
End: 2025-08-14
Payer: COMMERCIAL

## 2025-08-14 RX ORDER — SEMAGLUTIDE 0.25 MG/.5ML
0.25 INJECTION, SOLUTION SUBCUTANEOUS WEEKLY
Qty: 2 ML | Refills: 0 | OUTPATIENT
Start: 2025-08-14 | End: 2025-09-13

## 2025-08-20 ENCOUNTER — TELEPHONE (OUTPATIENT)
Dept: PRIMARY CARE | Facility: CLINIC | Age: 35
End: 2025-08-20
Payer: COMMERCIAL